# Patient Record
Sex: FEMALE | Race: WHITE | NOT HISPANIC OR LATINO | Employment: PART TIME | URBAN - METROPOLITAN AREA
[De-identification: names, ages, dates, MRNs, and addresses within clinical notes are randomized per-mention and may not be internally consistent; named-entity substitution may affect disease eponyms.]

---

## 2023-09-07 ENCOUNTER — APPOINTMENT (OUTPATIENT)
Dept: WOMENS IMAGING | Facility: HOSPITAL | Age: 38
End: 2023-09-07
Payer: COMMERCIAL

## 2023-09-07 PROCEDURE — 77062 BREAST TOMOSYNTHESIS BI: CPT | Performed by: RADIOLOGY

## 2023-09-07 PROCEDURE — 76642 ULTRASOUND BREAST LIMITED: CPT | Performed by: RADIOLOGY

## 2023-09-07 PROCEDURE — 77066 DX MAMMO INCL CAD BI: CPT | Performed by: RADIOLOGY

## 2023-09-07 PROCEDURE — G0279 TOMOSYNTHESIS, MAMMO: HCPCS | Performed by: RADIOLOGY

## 2023-10-02 ENCOUNTER — OFFICE VISIT (OUTPATIENT)
Dept: FAMILY MEDICINE CLINIC | Facility: CLINIC | Age: 38
End: 2023-10-02
Payer: COMMERCIAL

## 2023-10-02 ENCOUNTER — PATIENT ROUNDING (BHMG ONLY) (OUTPATIENT)
Dept: FAMILY MEDICINE CLINIC | Facility: CLINIC | Age: 38
End: 2023-10-02
Payer: COMMERCIAL

## 2023-10-02 VITALS
SYSTOLIC BLOOD PRESSURE: 144 MMHG | OXYGEN SATURATION: 100 % | WEIGHT: 141 LBS | DIASTOLIC BLOOD PRESSURE: 82 MMHG | HEART RATE: 65 BPM | BODY MASS INDEX: 22.13 KG/M2 | HEIGHT: 67 IN

## 2023-10-02 DIAGNOSIS — K58.2 IRRITABLE BOWEL SYNDROME WITH BOTH CONSTIPATION AND DIARRHEA: ICD-10-CM

## 2023-10-02 DIAGNOSIS — R53.83 FATIGUE, UNSPECIFIED TYPE: ICD-10-CM

## 2023-10-02 DIAGNOSIS — Z00.00 PREVENTATIVE HEALTH CARE: Primary | ICD-10-CM

## 2023-10-02 DIAGNOSIS — G47.09 OTHER INSOMNIA: ICD-10-CM

## 2023-10-02 PROCEDURE — 80061 LIPID PANEL: CPT | Performed by: NURSE PRACTITIONER

## 2023-10-02 PROCEDURE — 82607 VITAMIN B-12: CPT | Performed by: NURSE PRACTITIONER

## 2023-10-02 PROCEDURE — 80050 GENERAL HEALTH PANEL: CPT | Performed by: NURSE PRACTITIONER

## 2023-10-02 PROCEDURE — 82306 VITAMIN D 25 HYDROXY: CPT | Performed by: NURSE PRACTITIONER

## 2023-10-02 PROCEDURE — 86803 HEPATITIS C AB TEST: CPT | Performed by: NURSE PRACTITIONER

## 2023-10-02 RX ORDER — DICYCLOMINE HYDROCHLORIDE 10 MG/1
10 CAPSULE ORAL 3 TIMES DAILY PRN
Qty: 30 CAPSULE | Refills: 0 | Status: SHIPPED | OUTPATIENT
Start: 2023-10-02

## 2023-10-02 NOTE — PROGRESS NOTES
Chief Complaint  Chief Complaint   Patient presents with    [facility]     General check up     Irritable Bowel Syndrome     Dealing with diarrhea for a long time and now constipation issues. Concerns about possible IBS            Subjective          Vesnakareem Mcmahan presents to [facility] for   History of Present Illness      New 38-year-old female patient presents to [facility] with new provider. She follows with GYN, has a history of partial hysterectomy due to moderate dysplasia of the cervix AMANDA-II in 2019. She had a mammogram d/t right breast cyst, which was benign finding. She is an overall healthy female with complaints of diarrhea alternating with constipation since her early 20's, now constipation will go 2-3 days w/o BM. She drinks water, believes symptoms may be food related seems s/s are triggered by dairy, fish, leafy greens. She does report occasional abdominal cramping, spasticity and bloating, denies blood or mucus in the stool, dysuria, chest pain, palpitations, swelling of the extremities.  She reports insomnia, takes melatonin which is effective.       PHQ-9 Depression Screening  Little interest or pleasure in doing things? 0-->not at all   Feeling down, depressed, or hopeless? 0-->not at all   Trouble falling or staying asleep, or sleeping too much?     Feeling tired or having little energy?     Poor appetite or overeating?     Feeling bad about yourself - or that you are a failure or have let yourself or your family down?     Trouble concentrating on things, such as reading the newspaper or watching television?     Moving or speaking so slowly that other people could have noticed? Or the opposite - being so fidgety or restless that you have been moving around a lot more than usual?     Thoughts that you would be better off dead, or of hurting yourself in some way?     PHQ-9 Total Score 0   If you checked off any problems, how difficult have these problems  "made it for you to do your work, take care of things at home, or get along with other people?           The following portions of the patient's history were reviewed and updated as appropriate: allergies, current medications, past family history, past medical history, past social history, past surgical history and problem list.    History reviewed. No pertinent past medical history.  Past Surgical History:   Procedure Laterality Date    LAPAROSCOPIC HYSTERECTOMY      still has tubes and ovaries     Family History   Problem Relation Age of Onset    Cervical cancer Mother     No Known Problems Father      Social History     Tobacco Use    Smoking status: Former     Packs/day: 0.50     Years: 15.00     Pack years: 7.50     Types: Cigarettes     Quit date: 2017     Years since quittin.7    Smokeless tobacco: Never   Substance Use Topics    Alcohol use: Not Currently       Current Outpatient Medications:     MELATONIN PO, Take  by mouth., Disp: , Rfl:     dicyclomine (BENTYL) 10 MG capsule, Take 1 capsule by mouth 3 (Three) Times a Day As Needed for Abdominal Cramping (bloating, spasticity)., Disp: 30 capsule, Rfl: 0    Objective   Vital Signs:   /82 (BP Location: Left arm, Patient Position: Sitting, Cuff Size: Adult)   Pulse 65   Ht 168.9 cm (66.5\")   Wt 64 kg (141 lb)   SpO2 100%   BMI 22.42 kg/m²           Physical Exam  Constitutional:       General: She is not in acute distress.     Appearance: Normal appearance. She is well-developed. She is not ill-appearing or diaphoretic.   HENT:      Head: Normocephalic.   Eyes:      Conjunctiva/sclera: Conjunctivae normal.      Pupils: Pupils are equal, round, and reactive to light.   Neck:      Thyroid: No thyromegaly.      Vascular: No JVD.   Cardiovascular:      Rate and Rhythm: Normal rate and regular rhythm.      Heart sounds: Normal heart sounds. No murmur heard.  Pulmonary:      Effort: Pulmonary effort is normal. No respiratory distress.      Breath " sounds: Normal breath sounds. No wheezing or rhonchi.   Abdominal:      General: Bowel sounds are normal. There is no distension.      Palpations: Abdomen is soft.      Tenderness: There is no abdominal tenderness. There is no guarding.   Musculoskeletal:         General: No swelling or tenderness. Normal range of motion.      Cervical back: Normal range of motion and neck supple. No tenderness.   Lymphadenopathy:      Cervical: No cervical adenopathy.   Skin:     General: Skin is warm and dry.      Coloration: Skin is not jaundiced.      Findings: No erythema or rash.   Neurological:      General: No focal deficit present.      Mental Status: She is alert and oriented to person, place, and time. Mental status is at baseline.      Sensory: No sensory deficit.      Motor: No weakness.      Gait: Gait normal.   Psychiatric:         Mood and Affect: Mood normal.         Behavior: Behavior normal.         Thought Content: Thought content normal.         Judgment: Judgment normal.        Result Review :     No visits with results within 7 Day(s) from this visit.   Latest known visit with results is:   No results found for any previous visit.                  BMI is within normal parameters. No other follow-up for BMI required.           Assessment and Plan    Diagnoses and all orders for this visit:    1. Preventative health care (Primary)  Comments:  Age appropriate preventative counseling provided, including healthy lifestyle modifications and exercise  See GYN for annual pelvic exams and mammogram  Orders:  -     Lipid Panel  -     Comprehensive Metabolic Panel  -     CBC (No Diff)  -     TSH  -     Vitamin D,25-Hydroxy  -     Vitamin B12  -     Hepatitis C Antibody    2. Irritable bowel syndrome with both constipation and diarrhea  Comments:  recommend increasing fiber in diet  add probiotic 15 yonis units daily  increase to 2 liters water/day  try bentyl prn  Orders:  -     Lipid Panel  -     Comprehensive Metabolic  Panel  -     CBC (No Diff)  -     TSH  -     Vitamin D,25-Hydroxy  -     Vitamin B12  -     Hepatitis C Antibody    3. Other insomnia  Comments:  Continue melatonin    4. Fatigue, unspecified type  Comments:  Labs today, rule out vitamin deficiency    Other orders  -     dicyclomine (BENTYL) 10 MG capsule; Take 1 capsule by mouth 3 (Three) Times a Day As Needed for Abdominal Cramping (bloating, spasticity).  Dispense: 30 capsule; Refill: 0      Cont melatonin for sleep  Rec's as above for IBS, consider GI referral  Declines flu shot      I spent 30 minutes caring for Vesna Mcmahan on this date of service. This time includes time spent by me in the following activities: preparing for the visit, reviewing tests, performing a medically appropriate examination and/or evaluation , counseling and educating the patient/family/caregiver, ordering medications, tests, or procedures and documenting information in the medical record        Follow Up     Return in about 1 year (around 10/2/2024), or if symptoms worsen or fail to improve, for Recheck.  Patient was given instructions and counseling regarding her condition or for health maintenance advice. Please see specific information pulled into the AVS if appropriate.        Part of this note may be an electronic transcription/translation of spoken language to printed text using the Dragon Dictation System

## 2023-10-02 NOTE — PROGRESS NOTES
Venipuncture Blood Specimen Collection  Venipuncture performed in the right arm by Emma Rosales MA with good hemostasis. Patient tolerated the procedure well without complications.   10/02/23   Emma Rosales MA

## 2023-10-03 LAB
25(OH)D3 SERPL-MCNC: 40.2 NG/ML (ref 30–100)
ALBUMIN SERPL-MCNC: 4.6 G/DL (ref 3.5–5.2)
ALBUMIN/GLOB SERPL: 1.6 G/DL
ALP SERPL-CCNC: 46 U/L (ref 39–117)
ALT SERPL W P-5'-P-CCNC: 12 U/L (ref 1–33)
ANION GAP SERPL CALCULATED.3IONS-SCNC: 12.4 MMOL/L (ref 5–15)
AST SERPL-CCNC: 15 U/L (ref 1–32)
BILIRUB SERPL-MCNC: <0.2 MG/DL (ref 0–1.2)
BUN SERPL-MCNC: 11 MG/DL (ref 6–20)
BUN/CREAT SERPL: 14.7 (ref 7–25)
CALCIUM SPEC-SCNC: 10.1 MG/DL (ref 8.6–10.5)
CHLORIDE SERPL-SCNC: 102 MMOL/L (ref 98–107)
CHOLEST SERPL-MCNC: 254 MG/DL (ref 0–200)
CO2 SERPL-SCNC: 25.6 MMOL/L (ref 22–29)
CREAT SERPL-MCNC: 0.75 MG/DL (ref 0.57–1)
DEPRECATED RDW RBC AUTO: 40.7 FL (ref 37–54)
EGFRCR SERPLBLD CKD-EPI 2021: 104.7 ML/MIN/1.73
ERYTHROCYTE [DISTWIDTH] IN BLOOD BY AUTOMATED COUNT: 12.6 % (ref 12.3–15.4)
GLOBULIN UR ELPH-MCNC: 2.8 GM/DL
GLUCOSE SERPL-MCNC: 93 MG/DL (ref 65–99)
HCT VFR BLD AUTO: 38.8 % (ref 34–46.6)
HCV AB SER DONR QL: NORMAL
HDLC SERPL-MCNC: 50 MG/DL (ref 40–60)
HGB BLD-MCNC: 13.5 G/DL (ref 12–15.9)
LDLC SERPL CALC-MCNC: 182 MG/DL (ref 0–100)
LDLC/HDLC SERPL: 3.59 {RATIO}
MCH RBC QN AUTO: 31.4 PG (ref 26.6–33)
MCHC RBC AUTO-ENTMCNC: 34.8 G/DL (ref 31.5–35.7)
MCV RBC AUTO: 90.2 FL (ref 79–97)
PLATELET # BLD AUTO: 170 10*3/MM3 (ref 140–450)
PMV BLD AUTO: 12.9 FL (ref 6–12)
POTASSIUM SERPL-SCNC: 4.2 MMOL/L (ref 3.5–5.2)
PROT SERPL-MCNC: 7.4 G/DL (ref 6–8.5)
RBC # BLD AUTO: 4.3 10*6/MM3 (ref 3.77–5.28)
SODIUM SERPL-SCNC: 140 MMOL/L (ref 136–145)
TRIGL SERPL-MCNC: 122 MG/DL (ref 0–150)
TSH SERPL DL<=0.05 MIU/L-ACNC: 2.06 UIU/ML (ref 0.27–4.2)
VIT B12 BLD-MCNC: 281 PG/ML (ref 211–946)
VLDLC SERPL-MCNC: 22 MG/DL (ref 5–40)
WBC NRBC COR # BLD: 5.2 10*3/MM3 (ref 3.4–10.8)

## 2024-10-07 ENCOUNTER — OFFICE VISIT (OUTPATIENT)
Dept: FAMILY MEDICINE CLINIC | Facility: CLINIC | Age: 39
End: 2024-10-07
Payer: COMMERCIAL

## 2024-10-07 VITALS
HEART RATE: 85 BPM | DIASTOLIC BLOOD PRESSURE: 92 MMHG | BODY MASS INDEX: 21.91 KG/M2 | WEIGHT: 139.6 LBS | HEIGHT: 67 IN | OXYGEN SATURATION: 99 % | SYSTOLIC BLOOD PRESSURE: 134 MMHG

## 2024-10-07 DIAGNOSIS — Z00.00 PREVENTATIVE HEALTH CARE: Primary | ICD-10-CM

## 2024-10-07 DIAGNOSIS — Z23 NEED FOR TDAP VACCINATION: ICD-10-CM

## 2024-10-07 PROCEDURE — 90471 IMMUNIZATION ADMIN: CPT | Performed by: NURSE PRACTITIONER

## 2024-10-07 PROCEDURE — 80061 LIPID PANEL: CPT | Performed by: NURSE PRACTITIONER

## 2024-10-07 PROCEDURE — 36415 COLL VENOUS BLD VENIPUNCTURE: CPT | Performed by: NURSE PRACTITIONER

## 2024-10-07 PROCEDURE — 80050 GENERAL HEALTH PANEL: CPT | Performed by: NURSE PRACTITIONER

## 2024-10-07 PROCEDURE — 90715 TDAP VACCINE 7 YRS/> IM: CPT | Performed by: NURSE PRACTITIONER

## 2024-10-07 PROCEDURE — 99395 PREV VISIT EST AGE 18-39: CPT | Performed by: NURSE PRACTITIONER

## 2024-10-07 NOTE — PROGRESS NOTES
Chief Complaint  Chief Complaint   Patient presents with    Annual Exam           Subjective          Vesna Mcmahan presents to [facility] for   History of Present Illness      Patient with history of IBS, insomnia presents for annual exam, follows with gynecology for pelvic exams and mammogram.  She is overall doing well, taking melatonin occasionally for sleep.  She changed her diet, has eliminated dairy and IBS s/s have nearly resolved. She had previously had complaints of fatigue, B12 level was very low, she is not taking B12 but has increased exercise, running and made diet changes, she denies any issues with fatigue at this time.    She had an early mammogram due to a lump, found to be benign fibroglandular cystic.  Reports no changes, lump is still present      The following portions of the patient's history were reviewed and updated as appropriate: allergies, current medications, past family history, past medical history, past social history, past surgical history and problem list.    Past Medical History:   Diagnosis Date    Anxiety     Depression ,     Headache      Past Surgical History:   Procedure Laterality Date    LAPAROSCOPIC HYSTERECTOMY      still has tubes and ovaries    SUBTOTAL HYSTERECTOMY  2019     Family History   Problem Relation Age of Onset    Cervical cancer Mother     Cancer Mother         Cervical    No Known Problems Father     Depression Maternal Grandmother     Alcohol abuse Maternal Aunt     Cancer Maternal Aunt         Liver ()    Drug abuse Maternal Aunt     Early death Maternal Aunt     Cancer Maternal Uncle         Lungs ()     Social History     Tobacco Use    Smoking status: Former     Current packs/day: 0.00     Average packs/day: 0.5 packs/day for 15.0 years (7.5 ttl pk-yrs)     Types: Cigarettes     Start date: 2002     Quit date: 2019     Years since quittin.7    Smokeless tobacco: Never  "  Substance Use Topics    Alcohol use: Not Currently     Comment: rarely drink...maybe once a quarter a glass of wine       Current Outpatient Medications:     MELATONIN PO, Take  by mouth., Disp: , Rfl:     Objective   Vital Signs:   /92 (BP Location: Left arm, Patient Position: Sitting, Cuff Size: Adult)   Pulse 85   Ht 168.9 cm (66.5\")   Wt 63.3 kg (139 lb 9.6 oz)   SpO2 99%   BMI 22.19 kg/m²           Physical Exam  Constitutional:       General: She is not in acute distress.     Appearance: Normal appearance. She is well-developed. She is not ill-appearing or diaphoretic.   HENT:      Head: Normocephalic.   Eyes:      Conjunctiva/sclera: Conjunctivae normal.      Pupils: Pupils are equal, round, and reactive to light.   Neck:      Thyroid: No thyromegaly.      Vascular: No JVD.   Cardiovascular:      Rate and Rhythm: Normal rate and regular rhythm.      Heart sounds: Normal heart sounds. No murmur heard.  Pulmonary:      Effort: Pulmonary effort is normal. No respiratory distress.      Breath sounds: Normal breath sounds. No wheezing or rhonchi.   Abdominal:      General: Bowel sounds are normal. There is no distension.      Palpations: Abdomen is soft.      Tenderness: There is no abdominal tenderness.   Musculoskeletal:         General: No swelling or tenderness. Normal range of motion.      Cervical back: Normal range of motion and neck supple. No tenderness.   Lymphadenopathy:      Cervical: No cervical adenopathy.   Skin:     General: Skin is warm and dry.      Coloration: Skin is not jaundiced.      Findings: No erythema or rash.   Neurological:      General: No focal deficit present.      Mental Status: She is alert and oriented to person, place, and time. Mental status is at baseline.      Sensory: No sensory deficit.      Motor: No weakness.      Gait: Gait normal.   Psychiatric:         Mood and Affect: Mood normal.         Behavior: Behavior normal.         Thought Content: Thought content " normal.         Judgment: Judgment normal.          Result Review :     No visits with results within 7 Day(s) from this visit.   Latest known visit with results is:   Office Visit on 10/02/2023   Component Date Value Ref Range Status    Total Cholesterol 10/02/2023 254 (H)  0 - 200 mg/dL Final    Triglycerides 10/02/2023 122  0 - 150 mg/dL Final    HDL Cholesterol 10/02/2023 50  40 - 60 mg/dL Final    LDL Cholesterol  10/02/2023 182 (H)  0 - 100 mg/dL Final    VLDL Cholesterol 10/02/2023 22  5 - 40 mg/dL Final    LDL/HDL Ratio 10/02/2023 3.59   Final    Glucose 10/02/2023 93  65 - 99 mg/dL Final    BUN 10/02/2023 11  6 - 20 mg/dL Final    Creatinine 10/02/2023 0.75  0.57 - 1.00 mg/dL Final    Sodium 10/02/2023 140  136 - 145 mmol/L Final    Potassium 10/02/2023 4.2  3.5 - 5.2 mmol/L Final    Chloride 10/02/2023 102  98 - 107 mmol/L Final    CO2 10/02/2023 25.6  22.0 - 29.0 mmol/L Final    Calcium 10/02/2023 10.1  8.6 - 10.5 mg/dL Final    Total Protein 10/02/2023 7.4  6.0 - 8.5 g/dL Final    Albumin 10/02/2023 4.6  3.5 - 5.2 g/dL Final    ALT (SGPT) 10/02/2023 12  1 - 33 U/L Final    AST (SGOT) 10/02/2023 15  1 - 32 U/L Final    Alkaline Phosphatase 10/02/2023 46  39 - 117 U/L Final    Total Bilirubin 10/02/2023 <0.2  0.0 - 1.2 mg/dL Final    Globulin 10/02/2023 2.8  gm/dL Final    A/G Ratio 10/02/2023 1.6  g/dL Final    BUN/Creatinine Ratio 10/02/2023 14.7  7.0 - 25.0 Final    Anion Gap 10/02/2023 12.4  5.0 - 15.0 mmol/L Final    eGFR 10/02/2023 104.7  >60.0 mL/min/1.73 Final    WBC 10/02/2023 5.20  3.40 - 10.80 10*3/mm3 Final    RBC 10/02/2023 4.30  3.77 - 5.28 10*6/mm3 Final    Hemoglobin 10/02/2023 13.5  12.0 - 15.9 g/dL Final    Hematocrit 10/02/2023 38.8  34.0 - 46.6 % Final    MCV 10/02/2023 90.2  79.0 - 97.0 fL Final    MCH 10/02/2023 31.4  26.6 - 33.0 pg Final    MCHC 10/02/2023 34.8  31.5 - 35.7 g/dL Final    RDW 10/02/2023 12.6  12.3 - 15.4 % Final    RDW-SD 10/02/2023 40.7  37.0 - 54.0 fl Final    MPV  10/02/2023 12.9 (H)  6.0 - 12.0 fL Final    Platelets 10/02/2023 170  140 - 450 10*3/mm3 Final    TSH 10/02/2023 2.060  0.270 - 4.200 uIU/mL Final    25 Hydroxy, Vitamin D 10/02/2023 40.2  30.0 - 100.0 ng/ml Final    Vitamin B-12 10/02/2023 281  211 - 946 pg/mL Final    Hepatitis C Ab 10/02/2023 Non-Reactive  Non-Reactive Final                  BMI is within normal parameters. No other follow-up for BMI required.           Assessment and Plan    Diagnoses and all orders for this visit:    1. Preventative health care (Primary)  -     Lipid Panel  -     Comprehensive Metabolic Panel  -     CBC (No Diff)  -     TSH    2. Need for Tdap vaccination    Other orders  -     Tdap Vaccine => 6yo IM (BOOSTRIX/ADACEL)      Overall doing well  Labs today as ordered, patient is drinking coffee with almond milk  Cont melatonin for sleep as needed  Continue diet and exercise modifications  Follow-up with gynecology for pelvic exam and mammogram due at the age of 40  Age appropriate preventative counseling provided, including healthy lifestyle modifications and exercise  Declines flu shot    I spent 30 minutes caring for Vesna Mcmahan on this date of service. This time includes time spent by me in the following activities: preparing for the visit, reviewing tests, performing a medically appropriate examination and/or evaluation , counseling and educating the patient/family/caregiver, ordering medications, tests, or procedures and documenting information in the medical record        Follow Up     Return in about 1 year (around 10/7/2025) for Annual physical.  Patient was given instructions and counseling regarding her condition or for health maintenance advice. Please see specific information pulled into the AVS if appropriate.        Part of this note may be an electronic transcription/translation of spoken language to printed text using the Dragon Dictation System

## 2024-10-08 LAB
ALBUMIN SERPL-MCNC: 4.4 G/DL (ref 3.5–5.2)
ALBUMIN/GLOB SERPL: 1.8 G/DL
ALP SERPL-CCNC: 47 U/L (ref 39–117)
ALT SERPL W P-5'-P-CCNC: 11 U/L (ref 1–33)
ANION GAP SERPL CALCULATED.3IONS-SCNC: 9.6 MMOL/L (ref 5–15)
AST SERPL-CCNC: 15 U/L (ref 1–32)
BILIRUB SERPL-MCNC: 0.3 MG/DL (ref 0–1.2)
BUN SERPL-MCNC: 7 MG/DL (ref 6–20)
BUN/CREAT SERPL: 8.1 (ref 7–25)
CALCIUM SPEC-SCNC: 9.7 MG/DL (ref 8.6–10.5)
CHLORIDE SERPL-SCNC: 104 MMOL/L (ref 98–107)
CHOLEST SERPL-MCNC: 254 MG/DL (ref 0–200)
CO2 SERPL-SCNC: 23.4 MMOL/L (ref 22–29)
CREAT SERPL-MCNC: 0.86 MG/DL (ref 0.57–1)
DEPRECATED RDW RBC AUTO: 44 FL (ref 37–54)
EGFRCR SERPLBLD CKD-EPI 2021: 88.3 ML/MIN/1.73
ERYTHROCYTE [DISTWIDTH] IN BLOOD BY AUTOMATED COUNT: 12.6 % (ref 12.3–15.4)
GLOBULIN UR ELPH-MCNC: 2.4 GM/DL
GLUCOSE SERPL-MCNC: 87 MG/DL (ref 65–99)
HCT VFR BLD AUTO: 38.9 % (ref 34–46.6)
HDLC SERPL-MCNC: 50 MG/DL (ref 40–60)
HGB BLD-MCNC: 12.8 G/DL (ref 12–15.9)
LDLC SERPL CALC-MCNC: 181 MG/DL (ref 0–100)
LDLC/HDLC SERPL: 3.57 {RATIO}
MCH RBC QN AUTO: 31.2 PG (ref 26.6–33)
MCHC RBC AUTO-ENTMCNC: 32.9 G/DL (ref 31.5–35.7)
MCV RBC AUTO: 94.9 FL (ref 79–97)
PLATELET # BLD AUTO: 199 10*3/MM3 (ref 140–450)
PMV BLD AUTO: 11.9 FL (ref 6–12)
POTASSIUM SERPL-SCNC: 4.3 MMOL/L (ref 3.5–5.2)
PROT SERPL-MCNC: 6.8 G/DL (ref 6–8.5)
RBC # BLD AUTO: 4.1 10*6/MM3 (ref 3.77–5.28)
SODIUM SERPL-SCNC: 137 MMOL/L (ref 136–145)
TRIGL SERPL-MCNC: 127 MG/DL (ref 0–150)
TSH SERPL DL<=0.05 MIU/L-ACNC: 2.37 UIU/ML (ref 0.27–4.2)
VLDLC SERPL-MCNC: 23 MG/DL (ref 5–40)
WBC NRBC COR # BLD AUTO: 4.21 10*3/MM3 (ref 3.4–10.8)

## 2024-12-09 ENCOUNTER — OFFICE VISIT (OUTPATIENT)
Dept: FAMILY MEDICINE CLINIC | Facility: CLINIC | Age: 39
End: 2024-12-09
Payer: COMMERCIAL

## 2024-12-09 VITALS
HEART RATE: 90 BPM | OXYGEN SATURATION: 98 % | DIASTOLIC BLOOD PRESSURE: 81 MMHG | WEIGHT: 135.4 LBS | SYSTOLIC BLOOD PRESSURE: 117 MMHG | BODY MASS INDEX: 21.25 KG/M2 | HEIGHT: 67 IN

## 2024-12-09 DIAGNOSIS — H65.191 ACUTE MUCOID OTITIS MEDIA OF RIGHT EAR: ICD-10-CM

## 2024-12-09 DIAGNOSIS — J01.00 ACUTE NON-RECURRENT MAXILLARY SINUSITIS: Primary | ICD-10-CM

## 2024-12-09 PROCEDURE — 99213 OFFICE O/P EST LOW 20 MIN: CPT | Performed by: NURSE PRACTITIONER

## 2024-12-09 RX ORDER — AMOXICILLIN 875 MG/1
875 TABLET, COATED ORAL 2 TIMES DAILY
Qty: 20 TABLET | Refills: 0 | Status: SHIPPED | OUTPATIENT
Start: 2024-12-09 | End: 2024-12-19

## 2024-12-13 NOTE — PROGRESS NOTES
Chief Complaint  Chief Complaint   Patient presents with    Sinusitis     With congestion and yellow mucus. Ear pressure on right side x 2 weeks            Subjective          Vesna Mcmahan presents to [facility] for   History of Present Illness    Patient presents for same-day acute visit for symptoms as mentioned in chief complaint.  Has been using OTCs with no improvement in symptoms    The following portions of the patient's history were reviewed and updated as appropriate: allergies, current medications, past family history, past medical history, past social history, past surgical history and problem list.    Past Medical History:   Diagnosis Date    Anxiety     Depression ,     Headache      Past Surgical History:   Procedure Laterality Date    LAPAROSCOPIC HYSTERECTOMY      still has tubes and ovaries    SUBTOTAL HYSTERECTOMY  2019     Family History   Problem Relation Age of Onset    Cervical cancer Mother     Cancer Mother         Cervical    No Known Problems Father     Depression Maternal Grandmother     Alcohol abuse Maternal Aunt     Cancer Maternal Aunt         Liver ()    Drug abuse Maternal Aunt     Early death Maternal Aunt     Cancer Maternal Uncle         Lungs ()     Social History     Tobacco Use    Smoking status: Former     Current packs/day: 0.00     Average packs/day: 0.5 packs/day for 15.0 years (7.5 ttl pk-yrs)     Types: Cigarettes     Start date: 2002     Quit date: 2019     Years since quittin.9    Smokeless tobacco: Never   Substance Use Topics    Alcohol use: Not Currently     Comment: rarely drink...maybe once a quarter a glass of wine       Current Outpatient Medications:     MELATONIN PO, Take  by mouth., Disp: , Rfl:     amoxicillin (AMOXIL) 875 MG tablet, Take 1 tablet by mouth 2 (Two) Times a Day for 10 days., Disp: 20 tablet, Rfl: 0    Objective   Vital Signs:   Vitals:    24 1535   BP:  "117/81   BP Location: Left arm   Patient Position: Sitting   Cuff Size: Adult   Pulse: 90   SpO2: 98%   Weight: 61.4 kg (135 lb 6.4 oz)   Height: 168.9 cm (66.5\")   PainSc: 0-No pain       Body mass index is 21.53 kg/m².    Physical Exam  Constitutional:       General: She is not in acute distress.     Appearance: Normal appearance. She is well-developed. She is not ill-appearing or diaphoretic.   HENT:      Head: Normocephalic.      Comments: Frontal/maxillary sinus TTP, purulent nasal drainage and postnasal drip     Right Ear: Ear canal normal. A middle ear effusion is present.      Left Ear: Tympanic membrane and ear canal normal.      Mouth/Throat:      Pharynx: Posterior oropharyngeal erythema present.   Eyes:      Conjunctiva/sclera: Conjunctivae normal.      Pupils: Pupils are equal, round, and reactive to light.   Neck:      Thyroid: No thyromegaly.      Vascular: No JVD.   Cardiovascular:      Rate and Rhythm: Normal rate and regular rhythm.      Heart sounds: Normal heart sounds. No murmur heard.  Pulmonary:      Effort: Pulmonary effort is normal. No respiratory distress.      Breath sounds: Normal breath sounds. No wheezing or rhonchi.   Abdominal:      General: Bowel sounds are normal. There is no distension.      Palpations: Abdomen is soft.      Tenderness: There is no abdominal tenderness.   Musculoskeletal:         General: No swelling or tenderness. Normal range of motion.      Cervical back: Normal range of motion and neck supple. No tenderness.   Lymphadenopathy:      Cervical: No cervical adenopathy.   Skin:     General: Skin is warm and dry.      Coloration: Skin is not jaundiced.      Findings: No erythema or rash.   Neurological:      General: No focal deficit present.      Mental Status: She is alert and oriented to person, place, and time. Mental status is at baseline.      Sensory: No sensory deficit.      Motor: No weakness.      Gait: Gait normal.   Psychiatric:         Mood and Affect: " Mood normal.         Behavior: Behavior normal.         Thought Content: Thought content normal.         Judgment: Judgment normal.          Result Review :     No visits with results within 7 Day(s) from this visit.   Latest known visit with results is:   Office Visit on 10/07/2024   Component Date Value Ref Range Status    Total Cholesterol 10/07/2024 254 (H)  0 - 200 mg/dL Final    Triglycerides 10/07/2024 127  0 - 150 mg/dL Final    HDL Cholesterol 10/07/2024 50  40 - 60 mg/dL Final    LDL Cholesterol  10/07/2024 181 (H)  0 - 100 mg/dL Final    VLDL Cholesterol 10/07/2024 23  5 - 40 mg/dL Final    LDL/HDL Ratio 10/07/2024 3.57   Final    Glucose 10/07/2024 87  65 - 99 mg/dL Final    BUN 10/07/2024 7  6 - 20 mg/dL Final    Creatinine 10/07/2024 0.86  0.57 - 1.00 mg/dL Final    Sodium 10/07/2024 137  136 - 145 mmol/L Final    Potassium 10/07/2024 4.3  3.5 - 5.2 mmol/L Final    Chloride 10/07/2024 104  98 - 107 mmol/L Final    CO2 10/07/2024 23.4  22.0 - 29.0 mmol/L Final    Calcium 10/07/2024 9.7  8.6 - 10.5 mg/dL Final    Total Protein 10/07/2024 6.8  6.0 - 8.5 g/dL Final    Albumin 10/07/2024 4.4  3.5 - 5.2 g/dL Final    ALT (SGPT) 10/07/2024 11  1 - 33 U/L Final    AST (SGOT) 10/07/2024 15  1 - 32 U/L Final    Alkaline Phosphatase 10/07/2024 47  39 - 117 U/L Final    Total Bilirubin 10/07/2024 0.3  0.0 - 1.2 mg/dL Final    Globulin 10/07/2024 2.4  gm/dL Final    A/G Ratio 10/07/2024 1.8  g/dL Final    BUN/Creatinine Ratio 10/07/2024 8.1  7.0 - 25.0 Final    Anion Gap 10/07/2024 9.6  5.0 - 15.0 mmol/L Final    eGFR 10/07/2024 88.3  >60.0 mL/min/1.73 Final    WBC 10/07/2024 4.21  3.40 - 10.80 10*3/mm3 Final    RBC 10/07/2024 4.10  3.77 - 5.28 10*6/mm3 Final    Hemoglobin 10/07/2024 12.8  12.0 - 15.9 g/dL Final    Hematocrit 10/07/2024 38.9  34.0 - 46.6 % Final    MCV 10/07/2024 94.9  79.0 - 97.0 fL Final    MCH 10/07/2024 31.2  26.6 - 33.0 pg Final    MCHC 10/07/2024 32.9  31.5 - 35.7 g/dL Final    RDW 10/07/2024  12.6  12.3 - 15.4 % Final    RDW-SD 10/07/2024 44.0  37.0 - 54.0 fl Final    MPV 10/07/2024 11.9  6.0 - 12.0 fL Final    Platelets 10/07/2024 199  140 - 450 10*3/mm3 Final    TSH 10/07/2024 2.370  0.270 - 4.200 uIU/mL Final                  BMI is within normal parameters. No other follow-up for BMI required.           Assessment and Plan    Diagnoses and all orders for this visit:    1. Acute non-recurrent maxillary sinusitis (Primary)    2. Acute mucoid otitis media of right ear    Other orders  -     amoxicillin (AMOXIL) 875 MG tablet; Take 1 tablet by mouth 2 (Two) Times a Day for 10 days.  Dispense: 20 tablet; Refill: 0        I spent 20 minutes caring for Vesna Mcmahan on this date of service. This time includes time spent by me in the following activities: preparing for the visit, reviewing tests, performing a medically appropriate examination and/or evaluation , counseling and educating the patient/family/caregiver, ordering medications, tests, or procedures and documenting information in the medical record        Follow Up     Return if symptoms worsen or fail to improve in 3-4 days.  Patient was given instructions and counseling regarding her condition or for health maintenance advice. Please see specific information pulled into the AVS if appropriate.        Part of this note may be an electronic transcription/translation of spoken language to printed text using the Dragon Dictation System